# Patient Record
Sex: FEMALE | Race: BLACK OR AFRICAN AMERICAN | NOT HISPANIC OR LATINO
[De-identification: names, ages, dates, MRNs, and addresses within clinical notes are randomized per-mention and may not be internally consistent; named-entity substitution may affect disease eponyms.]

---

## 2020-09-25 ENCOUNTER — TRANSCRIPTION ENCOUNTER (OUTPATIENT)
Age: 60
End: 2020-09-25

## 2023-01-25 ENCOUNTER — NON-APPOINTMENT (OUTPATIENT)
Age: 63
End: 2023-01-25

## 2023-01-26 PROBLEM — Z00.00 ENCOUNTER FOR PREVENTIVE HEALTH EXAMINATION: Status: ACTIVE | Noted: 2023-01-26

## 2023-01-27 ENCOUNTER — NON-APPOINTMENT (OUTPATIENT)
Age: 63
End: 2023-01-27

## 2023-01-27 ENCOUNTER — APPOINTMENT (OUTPATIENT)
Dept: CARDIOLOGY | Facility: CLINIC | Age: 63
End: 2023-01-27
Payer: COMMERCIAL

## 2023-01-27 VITALS
HEIGHT: 63 IN | SYSTOLIC BLOOD PRESSURE: 130 MMHG | BODY MASS INDEX: 29.59 KG/M2 | HEART RATE: 88 BPM | DIASTOLIC BLOOD PRESSURE: 76 MMHG | OXYGEN SATURATION: 97 % | WEIGHT: 167 LBS

## 2023-01-27 DIAGNOSIS — Z83.438 FAMILY HISTORY OF OTHER DISORDER OF LIPOPROTEIN METABOLISM AND OTHER LIPIDEMIA: ICD-10-CM

## 2023-01-27 DIAGNOSIS — Z78.9 OTHER SPECIFIED HEALTH STATUS: ICD-10-CM

## 2023-01-27 PROCEDURE — 99204 OFFICE O/P NEW MOD 45 MIN: CPT | Mod: 25

## 2023-01-27 PROCEDURE — 93000 ELECTROCARDIOGRAM COMPLETE: CPT

## 2023-01-27 NOTE — DISCUSSION/SUMMARY
[FreeTextEntry1] : In a summary Rachel Osborne is a middle aged female with episode of chest tightness with shortness of breath when upset and also shortness of breath on exertion, EKG showed sinus rhythm. Will get Echo to assess LV systolic function and wall motion. Further plan based on Echo results. Meanwhile any recurrent symptoms call 911 and go to nearest ER. Explained Ms. Ramos in detail.

## 2023-01-27 NOTE — HISTORY OF PRESENT ILLNESS
[FreeTextEntry1] : Rachel Osborne is a 62 year old female with no significant medical history comes for cardiac evaluation. Yesterday while she was talking on Phone she got upset, starting having mild chest tightness along with shortness of breath which lasted for 30 minutes and relieved with relaxation . Went to Urgent care , had some tests and was told to follow up with Cardiologist. Also had mild pain in left upper back on and off which is relieved by itself. No exertional chest pains. No palpitations. Mild shortness of breath on exertion which is relieved with rest in few minutes of 1 year. In 2019 she had chest tightness for which had stress test and was told normal.

## 2023-01-27 NOTE — REVIEW OF SYSTEMS
[Dyspnea on exertion] : dyspnea during exertion [Chest Discomfort] : chest discomfort [Negative] : Respiratory [Blurry Vision] : no blurred vision [Lower Ext Edema] : no extremity edema [Palpitations] : no palpitations [Orthopnea] : no orthopnea [PND] : no PND [Abdominal Pain] : no abdominal pain [Nausea] : no nausea [Vomiting] : no vomiting [Heartburn] : no heartburn [Joint Pain] : no joint pain [Rash] : no rash [Itching] : no itching [Dizziness] : no dizziness [Tremor] : no tremor was seen [Numbness (Hypoesthesia)] : no numbness [Tingling (Paresthesia)] : no tingling [Confusion] : no confusion was observed [Anxiety] : no anxiety [Under Stress] : not under stress [Easy Bleeding] : no tendency for easy bleeding [Easy Bruising] : no tendency for easy bruising

## 2023-02-09 ENCOUNTER — APPOINTMENT (OUTPATIENT)
Dept: CARDIOLOGY | Facility: CLINIC | Age: 63
End: 2023-02-09
Payer: COMMERCIAL

## 2023-02-09 DIAGNOSIS — R06.02 SHORTNESS OF BREATH: ICD-10-CM

## 2023-02-09 PROCEDURE — 93306 TTE W/DOPPLER COMPLETE: CPT

## 2023-02-14 ENCOUNTER — APPOINTMENT (OUTPATIENT)
Dept: CARDIOLOGY | Facility: CLINIC | Age: 63
End: 2023-02-14
Payer: COMMERCIAL

## 2023-02-14 DIAGNOSIS — R07.89 OTHER CHEST PAIN: ICD-10-CM

## 2023-02-14 PROCEDURE — 93351 STRESS TTE COMPLETE: CPT

## 2023-08-17 ENCOUNTER — NON-APPOINTMENT (OUTPATIENT)
Age: 63
End: 2023-08-17

## 2024-05-23 ENCOUNTER — EMERGENCY (EMERGENCY)
Facility: HOSPITAL | Age: 64
LOS: 1 days | Discharge: HOME | End: 2024-05-23
Attending: EMERGENCY MEDICINE | Admitting: EMERGENCY MEDICINE
Payer: COMMERCIAL

## 2024-05-23 VITALS
DIASTOLIC BLOOD PRESSURE: 61 MMHG | TEMPERATURE: 100 F | HEART RATE: 116 BPM | RESPIRATION RATE: 17 BRPM | OXYGEN SATURATION: 100 % | SYSTOLIC BLOOD PRESSURE: 116 MMHG

## 2024-05-23 VITALS
DIASTOLIC BLOOD PRESSURE: 69 MMHG | RESPIRATION RATE: 18 BRPM | HEART RATE: 95 BPM | TEMPERATURE: 99 F | SYSTOLIC BLOOD PRESSURE: 119 MMHG | OXYGEN SATURATION: 100 %

## 2024-05-23 LAB
ALBUMIN SERPL ELPH-MCNC: 4.2 G/DL — SIGNIFICANT CHANGE UP (ref 3.3–5)
ALP SERPL-CCNC: 61 U/L — SIGNIFICANT CHANGE UP (ref 40–120)
ALT FLD-CCNC: 14 U/L — SIGNIFICANT CHANGE UP (ref 4–33)
ANION GAP SERPL CALC-SCNC: 14 MMOL/L — SIGNIFICANT CHANGE UP (ref 7–14)
APPEARANCE UR: CLEAR — SIGNIFICANT CHANGE UP
APTT BLD: 30.3 SEC — SIGNIFICANT CHANGE UP (ref 24.5–35.6)
AST SERPL-CCNC: 20 U/L — SIGNIFICANT CHANGE UP (ref 4–32)
BACTERIA # UR AUTO: NEGATIVE /HPF — SIGNIFICANT CHANGE UP
BASE EXCESS BLDV CALC-SCNC: 1.7 MMOL/L — SIGNIFICANT CHANGE UP (ref -2–3)
BASOPHILS # BLD AUTO: 0.01 K/UL — SIGNIFICANT CHANGE UP (ref 0–0.2)
BASOPHILS NFR BLD AUTO: 0.1 % — SIGNIFICANT CHANGE UP (ref 0–2)
BILIRUB SERPL-MCNC: 0.3 MG/DL — SIGNIFICANT CHANGE UP (ref 0.2–1.2)
BILIRUB UR-MCNC: NEGATIVE — SIGNIFICANT CHANGE UP
BLOOD GAS VENOUS COMPREHENSIVE RESULT: SIGNIFICANT CHANGE UP
BUN SERPL-MCNC: 15 MG/DL — SIGNIFICANT CHANGE UP (ref 7–23)
CALCIUM SERPL-MCNC: 9.3 MG/DL — SIGNIFICANT CHANGE UP (ref 8.4–10.5)
CAST: 1 /LPF — SIGNIFICANT CHANGE UP (ref 0–4)
CHLORIDE BLDV-SCNC: 100 MMOL/L — SIGNIFICANT CHANGE UP (ref 96–108)
CHLORIDE SERPL-SCNC: 98 MMOL/L — SIGNIFICANT CHANGE UP (ref 98–107)
CO2 BLDV-SCNC: 27.9 MMOL/L — HIGH (ref 22–26)
CO2 SERPL-SCNC: 22 MMOL/L — SIGNIFICANT CHANGE UP (ref 22–31)
COLOR SPEC: YELLOW — SIGNIFICANT CHANGE UP
CREAT SERPL-MCNC: 0.99 MG/DL — SIGNIFICANT CHANGE UP (ref 0.5–1.3)
DIFF PNL FLD: NEGATIVE — SIGNIFICANT CHANGE UP
EGFR: 64 ML/MIN/1.73M2 — SIGNIFICANT CHANGE UP
EOSINOPHIL # BLD AUTO: 0.01 K/UL — SIGNIFICANT CHANGE UP (ref 0–0.5)
EOSINOPHIL NFR BLD AUTO: 0.1 % — SIGNIFICANT CHANGE UP (ref 0–6)
FLUAV AG NPH QL: SIGNIFICANT CHANGE UP
FLUBV AG NPH QL: SIGNIFICANT CHANGE UP
GAS PNL BLDV: 135 MMOL/L — LOW (ref 136–145)
GLUCOSE BLDV-MCNC: 120 MG/DL — HIGH (ref 70–99)
GLUCOSE SERPL-MCNC: 127 MG/DL — HIGH (ref 70–99)
GLUCOSE UR QL: NEGATIVE MG/DL — SIGNIFICANT CHANGE UP
HCO3 BLDV-SCNC: 27 MMOL/L — SIGNIFICANT CHANGE UP (ref 22–29)
HCT VFR BLD CALC: 35.1 % — SIGNIFICANT CHANGE UP (ref 34.5–45)
HCT VFR BLDA CALC: 36 % — SIGNIFICANT CHANGE UP (ref 34.5–46.5)
HGB BLD CALC-MCNC: 12 G/DL — SIGNIFICANT CHANGE UP (ref 11.7–16.1)
HGB BLD-MCNC: 11.8 G/DL — SIGNIFICANT CHANGE UP (ref 11.5–15.5)
IANC: 5.72 K/UL — SIGNIFICANT CHANGE UP (ref 1.8–7.4)
IMM GRANULOCYTES NFR BLD AUTO: 0.3 % — SIGNIFICANT CHANGE UP (ref 0–0.9)
INR BLD: 0.98 RATIO — SIGNIFICANT CHANGE UP (ref 0.85–1.18)
KETONES UR-MCNC: 15 MG/DL
LACTATE BLDV-MCNC: 0.9 MMOL/L — SIGNIFICANT CHANGE UP (ref 0.5–2)
LEUKOCYTE ESTERASE UR-ACNC: ABNORMAL
LYMPHOCYTES # BLD AUTO: 2.49 K/UL — SIGNIFICANT CHANGE UP (ref 1–3.3)
LYMPHOCYTES # BLD AUTO: 27.8 % — SIGNIFICANT CHANGE UP (ref 13–44)
MCHC RBC-ENTMCNC: 28.4 PG — SIGNIFICANT CHANGE UP (ref 27–34)
MCHC RBC-ENTMCNC: 33.6 GM/DL — SIGNIFICANT CHANGE UP (ref 32–36)
MCV RBC AUTO: 84.4 FL — SIGNIFICANT CHANGE UP (ref 80–100)
MONOCYTES # BLD AUTO: 0.71 K/UL — SIGNIFICANT CHANGE UP (ref 0–0.9)
MONOCYTES NFR BLD AUTO: 7.9 % — SIGNIFICANT CHANGE UP (ref 2–14)
NEUTROPHILS # BLD AUTO: 5.72 K/UL — SIGNIFICANT CHANGE UP (ref 1.8–7.4)
NEUTROPHILS NFR BLD AUTO: 63.8 % — SIGNIFICANT CHANGE UP (ref 43–77)
NITRITE UR-MCNC: NEGATIVE — SIGNIFICANT CHANGE UP
NRBC # BLD: 0 /100 WBCS — SIGNIFICANT CHANGE UP (ref 0–0)
NRBC # FLD: 0 K/UL — SIGNIFICANT CHANGE UP (ref 0–0)
PCO2 BLDV: 42 MMHG — SIGNIFICANT CHANGE UP (ref 39–52)
PH BLDV: 7.41 — SIGNIFICANT CHANGE UP (ref 7.32–7.43)
PH UR: 5.5 — SIGNIFICANT CHANGE UP (ref 5–8)
PLATELET # BLD AUTO: 148 K/UL — LOW (ref 150–400)
PO2 BLDV: 31 MMHG — SIGNIFICANT CHANGE UP (ref 25–45)
POTASSIUM BLDV-SCNC: 3.9 MMOL/L — SIGNIFICANT CHANGE UP (ref 3.5–5.1)
POTASSIUM SERPL-MCNC: 3.8 MMOL/L — SIGNIFICANT CHANGE UP (ref 3.5–5.3)
POTASSIUM SERPL-SCNC: 3.8 MMOL/L — SIGNIFICANT CHANGE UP (ref 3.5–5.3)
PROT SERPL-MCNC: 8 G/DL — SIGNIFICANT CHANGE UP (ref 6–8.3)
PROT UR-MCNC: 30 MG/DL
PROTHROM AB SERPL-ACNC: 11.1 SEC — SIGNIFICANT CHANGE UP (ref 9.5–13)
RBC # BLD: 4.16 M/UL — SIGNIFICANT CHANGE UP (ref 3.8–5.2)
RBC # FLD: 13.2 % — SIGNIFICANT CHANGE UP (ref 10.3–14.5)
RBC CASTS # UR COMP ASSIST: 1 /HPF — SIGNIFICANT CHANGE UP (ref 0–4)
RSV RNA NPH QL NAA+NON-PROBE: SIGNIFICANT CHANGE UP
SAO2 % BLDV: 47.6 % — LOW (ref 67–88)
SARS-COV-2 RNA SPEC QL NAA+PROBE: SIGNIFICANT CHANGE UP
SODIUM SERPL-SCNC: 134 MMOL/L — LOW (ref 135–145)
SP GR SPEC: 1.03 — SIGNIFICANT CHANGE UP (ref 1–1.03)
SQUAMOUS # UR AUTO: 1 /HPF — SIGNIFICANT CHANGE UP (ref 0–5)
UROBILINOGEN FLD QL: 1 MG/DL — SIGNIFICANT CHANGE UP (ref 0.2–1)
WBC # BLD: 8.97 K/UL — SIGNIFICANT CHANGE UP (ref 3.8–10.5)
WBC # FLD AUTO: 8.97 K/UL — SIGNIFICANT CHANGE UP (ref 3.8–10.5)
WBC UR QL: 6 /HPF — HIGH (ref 0–5)

## 2024-05-23 PROCEDURE — 93010 ELECTROCARDIOGRAM REPORT: CPT

## 2024-05-23 PROCEDURE — 99284 EMERGENCY DEPT VISIT MOD MDM: CPT

## 2024-05-23 PROCEDURE — 71046 X-RAY EXAM CHEST 2 VIEWS: CPT | Mod: 26

## 2024-05-23 RX ORDER — IBUPROFEN 200 MG
600 TABLET ORAL ONCE
Refills: 0 | Status: COMPLETED | OUTPATIENT
Start: 2024-05-23 | End: 2024-05-23

## 2024-05-23 RX ORDER — ACETAMINOPHEN 500 MG
1000 TABLET ORAL ONCE
Refills: 0 | Status: COMPLETED | OUTPATIENT
Start: 2024-05-23 | End: 2024-05-23

## 2024-05-23 RX ORDER — SODIUM CHLORIDE 9 MG/ML
2000 INJECTION INTRAMUSCULAR; INTRAVENOUS; SUBCUTANEOUS ONCE
Refills: 0 | Status: COMPLETED | OUTPATIENT
Start: 2024-05-23 | End: 2024-05-23

## 2024-05-23 RX ADMIN — SODIUM CHLORIDE 2000 MILLILITER(S): 9 INJECTION INTRAMUSCULAR; INTRAVENOUS; SUBCUTANEOUS at 01:51

## 2024-05-23 RX ADMIN — Medication 600 MILLIGRAM(S): at 01:55

## 2024-05-23 RX ADMIN — Medication 400 MILLIGRAM(S): at 01:55

## 2024-05-23 NOTE — ED ADULT TRIAGE NOTE - CHIEF COMPLAINT QUOTE
Pt c/o productive cough x 5 days, brownish sputum. Endorses chills, sob. Denies chest pain, body aches, n/v/d. No Past Medical History .

## 2024-05-23 NOTE — ED PROVIDER NOTE - ATTENDING CONTRIBUTION TO CARE
63-year-old female, no past medical history, now presents with fatigue, sinus congestion, chills, myalgias, escalating cough with production of yellow/brown sputum over the past 5 days not responding to OTC analgesics and antipyretics.  No chest pain of any kind vomiting, diarrhea, urinary symptoms.no prior hx DVT/PE, no recent trauma/surgery, no hemoptysis, no exogenous estrogens or u/l leg swelling, the pt doesn't need further eval for PE in the ED based on the Pulmonary Embolism Rule-out Criteria (PERC).     VS: febrile, others notable for tachycardia, RR 24    Gen: Well appearing in NAD  Head: NC/AT  ENT: dry mucous membranes   Neck: trachea midline, FROM (painless) - neg Brudzinski   Resp:  No distress, CTABL  Abd: nontender   Ext: no deformities  Neuro:  A&Ox3  Skin:  Warm and dry as visualized  Psych:  appropriate affect and mood    Initial ED MDM: Sepsis - undifferentiated, but favoring viral syndrome given URI symptoms.  (Other DDx includes: PE, adrenal insufficiency, DKA, pancreatitis, anaphylaxis, SBO/LBO, hypovolemia/severe anemia, colitis, vasculitis, tox or w/d depending on clinical context & hx).   Pt has no pmhx & no RFs for PE   Plan: 1) D-stick/rectal temp/IV Access/IVF/Labs/UA/BCx & UCx.  2) CXR.  3) defer IV Abx given low likelihood of bacterial infection.  4) Frequent reassessment

## 2024-05-23 NOTE — ED PROVIDER NOTE - PROGRESS NOTE DETAILS
Rosalind FERRIS PGY3: Labs and imaging negative for emergent pathology at this time. Pt sx improved. safe for dc with pcp f/u and strict return precautions. Rosalind FERRIS PGY3: Labs and imaging negative for emergent pathology at this time. Pt sx improved. safe for dc with pcp f/u, anticipatory guidance, and strict return precautions.

## 2024-05-23 NOTE — ED PROVIDER NOTE - PATIENT PORTAL LINK FT
You can access the FollowMyHealth Patient Portal offered by [facility] by registering at the following website: [email or web address]. By joining [employer]’s FollowMyHealth portal, you will also be able to view your health information using other applications (apps) compatible with our system.

## 2024-05-23 NOTE — ED PROVIDER NOTE - CLINICAL SUMMARY MEDICAL DECISION MAKING FREE TEXT BOX
63-year-old female no significant past medical history  presents with 5 days of productive cough chills nasal congestion body aches.  Young child at home with similar symptoms.  Patient having yellowish cough for several days.  Denies any meds prior to arrival.  No chest pain or shortness of breath.  No abdominal pain nausea vomiting dizziness.  Able to tolerate p.o.  No urinary frequency dysuria hematuria.  On arrival patient tachycardic febrile to 102.  Exam with well-appearing female no acute distress.  Tachycardic rate regular rhythm.  Lungs clear bilaterally.  Abdomen soft nontender nondistended.  Nasal congestion.  No throat erythema or exudate.  No appreciable rashes.  Skin warm well-perfused.  Patient alert and oriented x 3.  No extremity spontaneously.  Presentation likely consistent with viral URI.  Will evaluate for pneumonia versus UTI.  Plan for labs chest x-ray IVF antipyretics and reassess.

## 2024-05-24 LAB
CULTURE RESULTS: SIGNIFICANT CHANGE UP
SPECIMEN SOURCE: SIGNIFICANT CHANGE UP

## 2024-05-28 LAB
CULTURE RESULTS: SIGNIFICANT CHANGE UP
CULTURE RESULTS: SIGNIFICANT CHANGE UP
SPECIMEN SOURCE: SIGNIFICANT CHANGE UP
SPECIMEN SOURCE: SIGNIFICANT CHANGE UP